# Patient Record
Sex: FEMALE | HISPANIC OR LATINO | URBAN - METROPOLITAN AREA
[De-identification: names, ages, dates, MRNs, and addresses within clinical notes are randomized per-mention and may not be internally consistent; named-entity substitution may affect disease eponyms.]

---

## 2023-11-06 ENCOUNTER — TELEPHONE (OUTPATIENT)
Age: 28
End: 2023-11-06

## 2023-12-05 ENCOUNTER — TELEPHONE (OUTPATIENT)
Dept: BARIATRICS | Facility: CLINIC | Age: 28
End: 2023-12-05

## 2023-12-05 NOTE — TELEPHONE ENCOUNTER
I called and left message for the patient to call the  mgt office in [place] back to schedule a consult. Received a referral from outside  pcp.

## 2024-03-28 ENCOUNTER — PATIENT OUTREACH (OUTPATIENT)
Dept: BARIATRICS | Facility: CLINIC | Age: 29
End: 2024-03-28

## 2024-04-01 ENCOUNTER — CONSULT (OUTPATIENT)
Dept: BARIATRICS | Facility: CLINIC | Age: 29
End: 2024-04-01
Payer: COMMERCIAL

## 2024-04-01 VITALS
HEART RATE: 88 BPM | WEIGHT: 258.6 LBS | HEIGHT: 68 IN | OXYGEN SATURATION: 99 % | BODY MASS INDEX: 39.19 KG/M2 | TEMPERATURE: 98.2 F | DIASTOLIC BLOOD PRESSURE: 80 MMHG | RESPIRATION RATE: 16 BRPM | SYSTOLIC BLOOD PRESSURE: 125 MMHG

## 2024-04-01 DIAGNOSIS — E66.9 CLASS 2 OBESITY: Primary | ICD-10-CM

## 2024-04-01 DIAGNOSIS — I10 ESSENTIAL HYPERTENSION: ICD-10-CM

## 2024-04-01 DIAGNOSIS — R63.8 ABNORMAL CRAVING: ICD-10-CM

## 2024-04-01 PROBLEM — E66.812 CLASS 2 OBESITY: Status: ACTIVE | Noted: 2024-04-01

## 2024-04-01 PROCEDURE — 99245 OFF/OP CONSLTJ NEW/EST HI 55: CPT | Performed by: PHYSICIAN ASSISTANT

## 2024-04-01 RX ORDER — TOPIRAMATE 50 MG/1
TABLET, FILM COATED ORAL
Qty: 30 TABLET | Refills: 2 | Status: SHIPPED | OUTPATIENT
Start: 2024-04-01 | End: 2024-04-01 | Stop reason: SDUPTHER

## 2024-04-01 RX ORDER — TOPIRAMATE 50 MG/1
TABLET, FILM COATED ORAL
Qty: 30 TABLET | Refills: 3 | Status: SHIPPED | OUTPATIENT
Start: 2024-04-01

## 2024-04-01 RX ORDER — HYDROCHLOROTHIAZIDE 12.5 MG/1
12.5 CAPSULE, GELATIN COATED ORAL DAILY
COMMUNITY
Start: 2024-01-15

## 2024-04-01 RX ORDER — MEDROXYPROGESTERONE ACETATE 150 MG/ML
150 INJECTION, SUSPENSION INTRAMUSCULAR
COMMUNITY
Start: 2023-07-25 | End: 2024-10-17

## 2024-04-01 NOTE — PROGRESS NOTES
Assessment/Plan:    Class 2 obesity  -Discussed options of HealthyCORE-Intensive Lifestyle Intervention Program, Very Low Calorie Diet-VLCD, Conservative Program, Usama-En-Y Gastric Bypass, and Vertical Sleeve Gastrectomy and the role of weight loss medications.  -Initial weight loss goal of 5-10% weight loss for improved health  -prefers to avoid bariatric surgery at this time   -Screening labs: BMP reviewed from 9/2023; ordered- recommended she check coverage  -Patient is interested in pursuing Conservative Program  -Calorie goals, sample menu, portion size guidelines, and food logging reviewed with the patient. Switch to non calorie beverages, add veg to meals; discussed plate recs   -Discussed supply issue of injectables   Patient denies personal and family history of MEN2 tumors and medullary thyroid/thyroid carcinoma. Patient denies personal history of pancreatitis.   Denies hx of kidney stone and glaucoma  -Not interested in phentermine due to the frequent visits.  -Start Topamax after/on day of next depo injection (as HCG done at this time and she is due for this tomorrow)   -Se profile reviewed with the pt    Follow up in approximately  3-4 months  with Non-Surgical Physician/Advanced Practitioner.    Goals:  Food log (ie.) [email or web address],[email or web address],[email or web address],Neurotrack.com,etc. baritastic  No sugary beverages. At least 64oz of water daily.  Increase physical activity by 10 minutes daily. Gradually increase physical activity to a goal of 5 days per week for 30 minutes of MODERATE intensity PLUS 2 days per week of FULL BODY resistance training  5-10 servings of fruits and vegetables per day and 25-35 grams of dietary fiber per day, gradually increasing    Diagnoses and all orders for this visit:    Class 2 obesity  -     TSH, 3rd generation with Free T4 reflex; Future  -     Cancel: Insulin, fasting; Future  -     Cancel: Hemoglobin A1C; Future  -     Cancel: Lipid panel; Future  -     Hemoglobin  A1C; Future  -     TSH, 3rd generation with Free T4 reflex; Future  -     Insulin, fasting; Future  -     Lipid panel; Future  -     Comprehensive metabolic panel; Future  -     topiramate (Topamax) 50 MG tablet; Take 1/2 tab po daily between 3-4 pm for 2 weeks then increase to one tab po daily between 3-4 pm    Essential hypertension  -     Hemoglobin A1C; Future  -     TSH, 3rd generation with Free T4 reflex; Future  -     Insulin, fasting; Future  -     Lipid panel; Future  -     Comprehensive metabolic panel; Future  -     topiramate (Topamax) 50 MG tablet; Take 1/2 tab po daily between 3-4 pm for 2 weeks then increase to one tab po daily between 3-4 pm    Abnormal craving  -     topiramate (Topamax) 50 MG tablet; Take 1/2 tab po daily between 3-4 pm for 2 weeks then increase to one tab po daily between 3-4 pm    Body mass index 39.0-39.9, adult    Subjective:   Chief Complaint   Patient presents with    Consult     Patient ID: Camelia Coombs  is a 28 y.o. female with excess weight/obesity here to pursue weight management.  Past Medical History:   Diagnosis Date    Hypertension      HPI:  Obesity/Excess Weight:  Severity: Severe  Onset:  after 3rd pregnancy and starting Depo- 4 years ago    Modifiers: Diet and Exercise for the past week   Contributing factors: Pregnancy and Medications, stress/emotional eating, craves sweets   Associated symptoms: body image issues and decreased self esteem    Goals: 150-160 lbs   Hydration: 2-3 bottles, juice in am and sodas when dining out   Alcohol: denies  Exercise: just started walking  Dining out: weekly  Occupation: no  STOPBAN/8    B:  coffee and sweet bread  S: something sweet   L: rice + chicken and beans   S: yes- cookies, juice   D: pizza or queso and eggs  S: no     The following portions of the patient's history were reviewed and updated as appropriate: allergies, current medications, past family history, past medical history, past social history, past  "surgical history, and problem list.    Review of Systems   Cardiovascular:  Negative for chest pain and palpitations.   Psychiatric/Behavioral:  Negative for suicidal ideas (Denies HI). Nervous/anxious: pt states she was referred here bc anxiety is making her eat; discussed she needs the anxiety treated by her PCP as well.      Objective:    /80 (BP Location: Left arm, Cuff Size: Large)   Pulse 88   Temp 98.2 °F (36.8 °C) (Temporal)   Resp 16   Ht 5' 8\" (1.727 m)   Wt 117 kg (258 lb 9.6 oz)   SpO2 99%   BMI 39.32 kg/m²     Physical Exam  Vitals and nursing note reviewed.     Constitutional   General appearance: Abnormal.  well developed and obese.   Pulmonary   Respiratory effort: No increased work of breathing or signs of respiratory distress.    Abdomen   Abdomen: Abnormal.  The abdomen was obese.   Musculoskeletal   Gait and station: Normal.    Psychiatric   Orientation to person, place and time: Normal.    Affect: appropriate    55 minute visit, >50% time spent counseling patient on surgical and nonsurgical interventions for the treatment of excess weight.  Discussed the advantages and long-term outcomes with regards to bariatric surgery.  Discussed in detail nonsurgical options including intensive lifestyle intervention program, very low-calorie diet program and conservative program.  Discussed the role of weight loss medications.  Counseled patient on diet behavior and exercise modification for weight loss.        "

## 2024-04-01 NOTE — PATIENT INSTRUCTIONS
Switch to zero calorie drinks  Make half of lunch and dinner plate vegetables  Try to have fruit as snacks

## 2024-04-01 NOTE — ASSESSMENT & PLAN NOTE
-Discussed options of HealthyCORE-Intensive Lifestyle Intervention Program, Very Low Calorie Diet-VLCD, Conservative Program, Usama-En-Y Gastric Bypass, and Vertical Sleeve Gastrectomy and the role of weight loss medications.  -Initial weight loss goal of 5-10% weight loss for improved health  -prefers to avoid bariatric surgery at this time   -Screening labs: BMP reviewed from 9/2023; ordered- recommended she check coverage  -Patient is interested in pursuing Conservative Program  -Calorie goals, sample menu, portion size guidelines, and food logging reviewed with the patient. Switch to non calorie beverages, add veg to meals; discussed plate recs   -Discussed supply issue of injectables   Patient denies personal and family history of MEN2 tumors and medullary thyroid/thyroid carcinoma. Patient denies personal history of pancreatitis.   Denies hx of kidney stone and glaucoma  -Not interested in phentermine due to the frequent visits.  -Start Topamax after/on day of next depo injection (as HCG done at this time and she is due for this tomorrow)   -Se profile reviewed with the pt

## 2024-04-15 LAB — HBA1C MFR BLD HPLC: 5.7 %

## 2024-04-29 ENCOUNTER — NURSE TRIAGE (OUTPATIENT)
Age: 29
End: 2024-04-29

## 2024-04-29 DIAGNOSIS — R73.03 PREDIABETES: Primary | ICD-10-CM

## 2024-04-29 NOTE — TELEPHONE ENCOUNTER
Interpretor [id number].  Patient of Ruba Eaton PA-C.  Patient had labs that were ordered by WM done at external lab and resulted 4/15.  Patient would like lab results.  Appears Ruba had ordered labs but PCP did as well.  Will call patient and advise to call PCP for results.      Interpretor [id number] during call back.  Called patient back to advice to Call Dr. Lan at [phone]. Patient states that she spoke to Dr. Lan already but they were done for WM.  Patient thought that Ruba would discuss with her d/t waiting to switch dose of medication from 1/2 tab to full tab.  Dr. Lan ordered labs for Ruba since they were done out of [facility] and Ruba originally ordered.  Contact: [phone]

## 2024-04-29 NOTE — TELEPHONE ENCOUNTER
[id number].  Patient of Ruba Eaton PA-C.  Patient returning call.  Advised patient that Ruba will call in next [employer] script after patient receives Depo shot.  Has questions for RD regarding results. Would like call back.  Contact: [phone].  Routed to clinical.

## 2024-04-29 NOTE — TELEPHONE ENCOUNTER
Sorry the document I reviewed looked like her PCPs labs. Yes, the insulin and A1c were elevated, which may improve with weight loss and dietary changes. I am not increasing the dose or continuing to fill the Topamax as she is late for DEPO shot. She needs to f/u with her GYN. We could use metformin as an alternative if she does not plan to continue on DEPO.     Thank you,   Ruba Eaton PA-C

## 2024-05-01 NOTE — TELEPHONE ENCOUNTER
Spoke to Camelia, then she put on family member on the phone to translate. Told her Yes, the insulin and A1c were elevated, which may improve with weight loss and dietary changes. I am not increasing the dose or continuing to fill the Topamax as she is late for DEPO shot. She needs to f/u with her GYN. We could use metformin as an alternative if she does not plan to continue on DEPO.      She would like to start taking the Metformin. She also asked if she would still have to take the shot to prevent pregnancy. I told her yes.

## 2024-05-02 RX ORDER — METFORMIN HYDROCHLORIDE 750 MG/1
750 TABLET, EXTENDED RELEASE ORAL
Qty: 30 TABLET | Refills: 2 | Status: SHIPPED | OUTPATIENT
Start: 2024-05-02

## 2024-05-02 NOTE — TELEPHONE ENCOUNTER
Metformin is safe in pregnancy so if she is not interested in depo she does not need to take the shot. She should stop the metformin if she becomes pregnant unless otherwise instructed by obgyn to continue.     Common side effects : abdominal upset, diarrhea

## 2024-05-15 ENCOUNTER — TELEPHONE (OUTPATIENT)
Age: 29
End: 2024-05-15

## 2024-05-15 DIAGNOSIS — R73.03 PREDIABETES: ICD-10-CM

## 2024-05-15 RX ORDER — METFORMIN HYDROCHLORIDE 750 MG/1
750 TABLET, EXTENDED RELEASE ORAL
Qty: 30 TABLET | Refills: 2 | Status: SHIPPED | OUTPATIENT
Start: 2024-05-15

## 2024-05-15 NOTE — TELEPHONE ENCOUNTER
CELINA.  Patient of Ruba Eaton PA-C. Patient calling as her prescription for her Metformin 750mg-take one tablet daily with food never reached her pharmacy. Nurse refaxed to [facility] in [facility].   used for call. [place] #[id number]    Addendum:Unable to get [facility]

## 2024-06-07 DIAGNOSIS — R73.03 PREDIABETES: ICD-10-CM

## 2024-06-07 RX ORDER — METFORMIN HYDROCHLORIDE 750 MG/1
750 TABLET, EXTENDED RELEASE ORAL
Qty: 90 TABLET | Refills: 1 | Status: SHIPPED | OUTPATIENT
Start: 2024-06-07

## 2024-08-03 ENCOUNTER — TELEPHONE (OUTPATIENT)
Dept: BARIATRICS | Facility: CLINIC | Age: 29
End: 2024-08-03

## 2024-08-03 ENCOUNTER — TELEPHONE (OUTPATIENT)
Dept: OTHER | Facility: OTHER | Age: 29
End: 2024-08-03

## 2024-08-03 ENCOUNTER — OFFICE VISIT (OUTPATIENT)
Dept: BARIATRICS | Facility: CLINIC | Age: 29
End: 2024-08-03
Payer: COMMERCIAL

## 2024-08-03 VITALS
DIASTOLIC BLOOD PRESSURE: 74 MMHG | HEIGHT: 68 IN | SYSTOLIC BLOOD PRESSURE: 129 MMHG | RESPIRATION RATE: 16 BRPM | TEMPERATURE: 97.9 F | HEART RATE: 87 BPM | WEIGHT: 255 LBS | BODY MASS INDEX: 38.65 KG/M2 | OXYGEN SATURATION: 99 %

## 2024-08-03 DIAGNOSIS — R73.03 PREDIABETES: ICD-10-CM

## 2024-08-03 DIAGNOSIS — E66.9 CLASS 2 OBESITY: Primary | ICD-10-CM

## 2024-08-03 DIAGNOSIS — Z79.899 MEDICATION MANAGEMENT: ICD-10-CM

## 2024-08-03 PROCEDURE — 99214 OFFICE O/P EST MOD 30 MIN: CPT | Performed by: PHYSICIAN ASSISTANT

## 2024-08-03 RX ORDER — TIRZEPATIDE 2.5 MG/.5ML
2.5 INJECTION, SOLUTION SUBCUTANEOUS WEEKLY
Qty: 2 ML | Refills: 0 | Status: SHIPPED | OUTPATIENT
Start: 2024-08-03 | End: 2024-08-31

## 2024-08-03 RX ORDER — TIRZEPATIDE 7.5 MG/.5ML
7.5 INJECTION, SOLUTION SUBCUTANEOUS WEEKLY
Qty: 2 ML | Refills: 0 | Status: SHIPPED | OUTPATIENT
Start: 2024-08-03 | End: 2024-08-31

## 2024-08-03 RX ORDER — TIRZEPATIDE 5 MG/.5ML
5 INJECTION, SOLUTION SUBCUTANEOUS WEEKLY
Qty: 2 ML | Refills: 0 | Status: SHIPPED | OUTPATIENT
Start: 2024-08-03 | End: 2024-08-31

## 2024-08-03 NOTE — PROGRESS NOTES
Assessment/Plan:    Class 2 obesity  -Patient is pursuing Conservative Program  -Initial weight loss goal of 5-10% weight loss for improved health  -Screening labs  Patient denies personal and family history of MEN2 tumors and medullary thyroid/thyroid carcinoma. Patient denies personal history of pancreatitis.   Denies hx of kidney stone and glaucoma  -Not interested in phentermine due to the frequent visits.  -Dietary recall reviewed, suggestions provided  She is interested in Zepbound. SE profile reviewed. Injection/titration instructions provided. She is aware she should avoid pregnancy while on Zepbound.   Intepretor: [id number]    Initial: 258.6 (Class 2 Obesity, BMI 39.32)  Current: 255  Change: -3.6  Goal: 150-160 lbs     Follow up in approximately 3 months with Non-Surgical Physician/Advanced Practitioner.    Goals:  Food log (ie.) [email or web address],sparkpeople.com,Backyardit.[email or web address],Macrotherapy.com,etc. baritastic  No sugary beverages. At least 64oz of water daily.  Increase physical activity by 10 minutes daily. Gradually increase physical activity to a goal of 5 days per week for 30 minutes of MODERATE intensity PLUS 2 days per week of FULL BODY resistance training  5-10 servings of fruits and vegetables per day and 25-35 grams of dietary fiber per day, gradually increasing     Diagnoses and all orders for this visit:    Class 2 obesity  -     Basic metabolic panel; Future  -     tirzepatide (Zepbound) 2.5 mg/0.5 mL auto-injector; Inject 0.5 mL (2.5 mg total) under the skin once a week for 28 days Then increase to 5 mg  -     tirzepatide (Zepbound) 5 mg/0.5 mL auto-injector; Inject 0.5 mL (5 mg total) under the skin once a week for 28 days Then increase to 7.5 mg  -     tirzepatide (Zepbound) 7.5 mg/0.5 mL auto-injector; Inject 0.5 mL (7.5 mg total) under the skin once a week for 28 days Then increase to 10 mg    Body mass index 38.0-38.9, adult  -     Basic metabolic panel; Future    Medication management  -      "Basic metabolic panel; Future    Prediabetes  -     tirzepatide (Zepbound) 2.5 mg/0.5 mL auto-injector; Inject 0.5 mL (2.5 mg total) under the skin once a week for 28 days Then increase to 5 mg  -     tirzepatide (Zepbound) 5 mg/0.5 mL auto-injector; Inject 0.5 mL (5 mg total) under the skin once a week for 28 days Then increase to 7.5 mg  -     tirzepatide (Zepbound) 7.5 mg/0.5 mL auto-injector; Inject 0.5 mL (7.5 mg total) under the skin once a week for 28 days Then increase to 10 mg        Subjective:   Chief Complaint   Patient presents with    Follow-up     MWM follow up      Patient ID: Camelia Coombs  is a 28 y.o. female with excess weight/obesity here to pursue weight management.  Patient is pursuing Conservative Program.     HPI  The patient presents for MWM follow up.     Was waiting for DEPO injection so we did not start Topamax.She has since received Depo    Since last visit worked on smaller portions and eating better. Just returned from 20 day vacation last night. Felt she got off track.    Prior to vacation:   B: scrambled eggs + toast + coffee (a little bit of sugar)   S:  banana or orange   L: salami or chicken, sometimes with beans  S: fruit  D: eggs + cheese + mashed potatoes  S: fruit    Increased appetite/cravings:  Exercise: has gym membership, but stopped over her vacation, plans to restart   Hydration: has been down, was drinking soda over vacation, but does not plan to continue. Recommended switch to diet     The following portions of the patient's history were reviewed and updated as appropriate: allergies, current medications, past family history, past medical history, past social history, past surgical history, and problem list.    Review of Systems    Objective:    /74 (BP Location: Right arm, Patient Position: Sitting, Cuff Size: Adult)   Pulse 87   Temp 97.9 °F (36.6 °C) (Temporal)   Resp 16   Ht 5' 8\" (1.727 m)   Wt 116 kg (255 lb)   SpO2 99%   BMI 38.77 kg/m²      " Physical Exam  Vitals and nursing note reviewed.     Constitutional   General appearance: Abnormal.  well developed and obese.   Pulmonary   Respiratory effort: No increased work of breathing or signs of respiratory distress.    Abdomen   Abdomen: Abnormal.  The abdomen was obese.   Musculoskeletal   Gait and station: Normal.    Psychiatric   Orientation to person, place and time: Normal.    Affect: appropriate

## 2024-08-03 NOTE — PATIENT INSTRUCTIONS
Add veg to lunch and dinner (try for this to be 1/2 of your plate)   Limit how much cheese as this can be high in calorie   Switch to diet drinks    IF zepbound is covered STOP metformin    For Zepbound:   Inject 2.5 mg subcutaneous every week x4 weeks then  5 mg every week for 4 weeks then   7.5 mg every week for 4 weeks then 10 mg every week for 4 weeks  Then 12.5 mg every week for 4 weeks then  15 mg every week for 4 weeks       -Females should use another type of birth control for 4 weeks after you start Zepbound and for 4 weeks after each increase in your dose of Zepbound.   Visit [email or web address] for further information/injection instructions. Please eat small frequent meals to help reduce nausea. Lemon water and saltine crackers may help with this. If you experience fever, nausea/vomiting, and pain radiating to your back this may be a sign of pancreatitis. Please have ER evaluation with this occurs. Zepbound should be stopped two months prior to trying to conceive.   -Clean skin before injecting. Rotate injection site each time.

## 2024-08-03 NOTE — ASSESSMENT & PLAN NOTE
-Patient is pursuing Conservative Program  -Initial weight loss goal of 5-10% weight loss for improved health  -Screening labs  Patient denies personal and family history of MEN2 tumors and medullary thyroid/thyroid carcinoma. Patient denies personal history of pancreatitis.   Denies hx of kidney stone and glaucoma  -Not interested in phentermine due to the frequent visits.  -Dietary recall reviewed, suggestions provided  She is interested in Zepbound. SE profile reviewed. Injection/titration instructions provided. She is aware she should avoid pregnancy while on Zepbound.   Intepretor: [id number]    Initial: 258.6 (Class 2 Obesity, BMI 39.32)  Current: 255  Change: -3.6  Goal: 150-160 lbs

## 2024-08-05 ENCOUNTER — TELEPHONE (OUTPATIENT)
Dept: BARIATRICS | Facility: CLINIC | Age: 29
End: 2024-08-05

## 2024-08-05 NOTE — TELEPHONE ENCOUNTER
Spoke to Camelia and apologized that we did not call to inform you of the new location stating on 8/1/24.

## 2024-08-08 ENCOUNTER — TELEPHONE (OUTPATIENT)
Dept: BARIATRICS | Facility: CLINIC | Age: 29
End: 2024-08-08

## 2024-08-27 ENCOUNTER — APPOINTMENT (OUTPATIENT)
Dept: LAB | Facility: HOSPITAL | Age: 29
End: 2024-08-27
Payer: COMMERCIAL

## 2024-08-27 DIAGNOSIS — E66.9 CLASS 2 OBESITY: ICD-10-CM

## 2024-08-27 DIAGNOSIS — I10 ESSENTIAL HYPERTENSION: ICD-10-CM

## 2024-08-27 DIAGNOSIS — Z79.899 MEDICATION MANAGEMENT: ICD-10-CM

## 2024-08-27 LAB
ALBUMIN SERPL BCG-MCNC: 4.1 G/DL (ref 3.5–5)
ALP SERPL-CCNC: 71 U/L (ref 34–104)
ALT SERPL W P-5'-P-CCNC: 12 U/L (ref 7–52)
ANION GAP SERPL CALCULATED.3IONS-SCNC: 8 MMOL/L (ref 4–13)
AST SERPL W P-5'-P-CCNC: 13 U/L (ref 13–39)
BILIRUB SERPL-MCNC: 0.51 MG/DL (ref 0.2–1)
BUN SERPL-MCNC: 9 MG/DL (ref 5–25)
CALCIUM SERPL-MCNC: 9.2 MG/DL (ref 8.4–10.2)
CHLORIDE SERPL-SCNC: 104 MMOL/L (ref 96–108)
CHOLEST SERPL-MCNC: 142 MG/DL
CO2 SERPL-SCNC: 26 MMOL/L (ref 21–32)
CREAT SERPL-MCNC: 0.65 MG/DL (ref 0.6–1.3)
EST. AVERAGE GLUCOSE BLD GHB EST-MCNC: 111 MG/DL
GFR SERPL CREATININE-BSD FRML MDRD: 119 ML/MIN/1.73SQ M
GLUCOSE P FAST SERPL-MCNC: 94 MG/DL (ref 65–99)
HBA1C MFR BLD: 5.5 %
HDLC SERPL-MCNC: 30 MG/DL
INSULIN SERPL-ACNC: 18.76 UIU/ML (ref 1.9–23)
LDLC SERPL CALC-MCNC: 97 MG/DL (ref 0–100)
NONHDLC SERPL-MCNC: 112 MG/DL
POTASSIUM SERPL-SCNC: 3.5 MMOL/L (ref 3.5–5.3)
PROT SERPL-MCNC: 7.3 G/DL (ref 6.4–8.4)
SODIUM SERPL-SCNC: 138 MMOL/L (ref 135–147)
TRIGL SERPL-MCNC: 74 MG/DL
TSH SERPL DL<=0.05 MIU/L-ACNC: 3.71 UIU/ML (ref 0.45–4.5)

## 2024-08-27 PROCEDURE — 84443 ASSAY THYROID STIM HORMONE: CPT

## 2024-08-27 PROCEDURE — 80053 COMPREHEN METABOLIC PANEL: CPT

## 2024-08-27 PROCEDURE — 83036 HEMOGLOBIN GLYCOSYLATED A1C: CPT

## 2024-08-27 PROCEDURE — 83525 ASSAY OF INSULIN: CPT

## 2024-08-27 PROCEDURE — 36415 COLL VENOUS BLD VENIPUNCTURE: CPT

## 2024-08-27 PROCEDURE — 80061 LIPID PANEL: CPT

## 2024-10-05 ENCOUNTER — OFFICE VISIT (OUTPATIENT)
Age: 29
End: 2024-10-05
Payer: COMMERCIAL

## 2024-10-05 VITALS
DIASTOLIC BLOOD PRESSURE: 73 MMHG | RESPIRATION RATE: 16 BRPM | HEIGHT: 68 IN | SYSTOLIC BLOOD PRESSURE: 121 MMHG | BODY MASS INDEX: 35.01 KG/M2 | WEIGHT: 231 LBS | TEMPERATURE: 97.4 F | HEART RATE: 89 BPM

## 2024-10-05 DIAGNOSIS — Z79.899 MEDICATION MANAGEMENT: ICD-10-CM

## 2024-10-05 DIAGNOSIS — E66.812 CLASS 2 OBESITY: Primary | ICD-10-CM

## 2024-10-05 PROCEDURE — 99214 OFFICE O/P EST MOD 30 MIN: CPT | Performed by: PHYSICIAN ASSISTANT

## 2024-10-05 RX ORDER — TIRZEPATIDE 10 MG/.5ML
10 INJECTION, SOLUTION SUBCUTANEOUS WEEKLY
Qty: 2 ML | Refills: 5 | Status: SHIPPED | OUTPATIENT
Start: 2024-10-05 | End: 2024-11-02

## 2024-10-05 NOTE — PATIENT INSTRUCTIONS
Try using unsweetened soy milk in your smoothie as this has more protein than almond milk   Consider greek yogurt or 1/4 c of nuts with banana     Can try magnesium for constipation     Recommend checking lab coverage before having labs drawn      Intente usar leche de soja sin azúcar en skelton batido, ya que tiene más proteínas que la leche de almendras.   Considere yogur ke o 1/4 c de nueces con plátano     ¿Puedes probar el magnesio para el estreñimiento?    Se recomienda verificar la cobertura del laboratorio antes de realizar exámenes de laboratorio.

## 2024-10-05 NOTE — ASSESSMENT & PLAN NOTE
-Patient is pursuing Conservative Program  -Initial weight loss goal of 5-10% weight loss for improved health  -Screening labs: CMP, TSH, A1c, insulin, and lipid panel reviewed from 8/27/24  Patient denies personal and family history of MEN2 tumors and medullary thyroid/thyroid carcinoma. Patient denies personal history of pancreatitis.   Denies hx of kidney stone and glaucoma  -Not interested in phentermine due to the frequent visits.  -Dietary recall reviewed, suggestions provided; constipation addressed  -Zepbound started 8/3/24 (258.6)    Initial: 258.6 (Class 2 Obesity, BMI 39.32)  Current: 231 (-24 lbs since last visit)   Change: -27.6  Goal: 150-160 lbs

## 2024-10-05 NOTE — PROGRESS NOTES
Assessment/Plan:    Class 2 obesity  -Patient is pursuing Conservative Program  -Initial weight loss goal of 5-10% weight loss for improved health  -Screening labs: CMP, TSH, A1c, insulin, and lipid panel reviewed from 8/27/24  Patient denies personal and family history of MEN2 tumors and medullary thyroid/thyroid carcinoma. Patient denies personal history of pancreatitis.   Denies hx of kidney stone and glaucoma  -Not interested in phentermine due to the frequent visits.  -Dietary recall reviewed, suggestions provided; constipation addressed  -Zepbound started 8/3/24 (258.6)    Initial: 258.6 (Class 2 Obesity, BMI 39.32)  Current: 231 (-24 lbs since last visit)   Change: -27.6  Goal: 150-160 lbs     Follow up in 4 months     Monitor BP and notify provider if low (<110/70) or sx of lightheadedness.   Reviewed potential supply issues for this medication     Goals:  Food log (ie.) www.[employer].com,sparkpeople.com,loseit.com,calorieking.com,etc. baritastic  No sugary beverages. At least 64oz of water daily.  Increase physical activity by 10 minutes daily. Gradually increase physical activity to a goal of 5 days per week for 30 minutes of MODERATE intensity PLUS 2 days per week of FULL BODY resistance training  5-10 servings of fruits and vegetables per day and 25-35 grams of dietary fiber per day, gradually increasing  Try using unsweetened soy milk in your smoothie as this has more protein than almond milk   Consider greek yogurt or 1/4 c of nuts with banana   Can try magnesium for constipation      Diagnoses and all orders for this visit:    Class 2 obesity  -     tirzepatide (Zepbound) 10 mg/0.5 mL auto-injector; Inject 0.5 mL (10 mg total) under the skin once a week for 28 days  -     Basic metabolic panel; Future    Body mass index 35.0-35.9, adult  -     Basic metabolic panel; Future    Medication management  -     Basic metabolic panel; Future        Subjective:   Chief Complaint   Patient presents with     "Follow-up     MWM follow up      Patient ID: Camelia Coombs  is a 28 y.o. female with excess weight/obesity here to pursue weight management.  Patient is pursuing Conservative Program.     HPI  The patient presents for MWM follow up.     Tolerating Finished 5 mg, starting 7.5 mg on Monday   Nausea on first 2 days  +effects on appetite    B: egg +/- bread  S: no  L: fish + salad  S: no  D: smoothie or banana   S: no    Checks BP at home and systolic is usually 120; denies lightheadedness    Exercise: 30 minutes 3x/week  Hydration: \"a lot\" of water- estimates  mL     The following portions of the patient's history were reviewed and updated as appropriate: allergies, current medications, past family history, past medical history, past social history, past surgical history, and problem list.    Review of Systems   Gastrointestinal:  Positive for constipation (sometimes hard to pass, other times not).     Objective:    /73 (BP Location: Right arm, Patient Position: Sitting, Cuff Size: Adult)   Pulse 89   Temp (!) 97.4 °F (36.3 °C) (Temporal)   Resp 16   Ht 5' 8\" (1.727 m)   Wt 105 kg (231 lb)   BMI 35.12 kg/m²      Physical Exam  Vitals and nursing note reviewed.     Constitutional   General appearance: Abnormal.  well developed and obese.   Pulmonary   Respiratory effort: No increased work of breathing or signs of respiratory distress.    Abdomen   Abdomen: Abnormal.  The abdomen was obese.   Musculoskeletal   Gait and station: Normal.    Psychiatric   Orientation to person, place and time: Normal.    Affect: appropriate    "

## 2024-11-11 ENCOUNTER — APPOINTMENT (OUTPATIENT)
Dept: LAB | Facility: HOSPITAL | Age: 29
End: 2024-11-11
Payer: COMMERCIAL

## 2024-11-11 DIAGNOSIS — E66.812 CLASS 2 OBESITY: ICD-10-CM

## 2024-11-11 DIAGNOSIS — Z79.899 MEDICATION MANAGEMENT: ICD-10-CM

## 2024-11-11 LAB
ANION GAP SERPL CALCULATED.3IONS-SCNC: 6 MMOL/L (ref 4–13)
BUN SERPL-MCNC: 9 MG/DL (ref 5–25)
CALCIUM SERPL-MCNC: 9.2 MG/DL (ref 8.4–10.2)
CHLORIDE SERPL-SCNC: 105 MMOL/L (ref 96–108)
CO2 SERPL-SCNC: 27 MMOL/L (ref 21–32)
CREAT SERPL-MCNC: 0.67 MG/DL (ref 0.6–1.3)
GFR SERPL CREATININE-BSD FRML MDRD: 118 ML/MIN/1.73SQ M
GLUCOSE P FAST SERPL-MCNC: 97 MG/DL (ref 65–99)
POTASSIUM SERPL-SCNC: 4 MMOL/L (ref 3.5–5.3)
SODIUM SERPL-SCNC: 138 MMOL/L (ref 135–147)

## 2024-11-11 PROCEDURE — 80048 BASIC METABOLIC PNL TOTAL CA: CPT

## 2024-11-11 PROCEDURE — 36415 COLL VENOUS BLD VENIPUNCTURE: CPT

## 2025-01-27 NOTE — PROGRESS NOTES
Assessment/Plan:  Class 1 obesity  -Patient is pursuing Conservative Program  -Initial weight loss goal of 5-10% weight loss for improved health  -Screening labs: CMP, TSH, A1c, insulin, and lipid panel reviewed from 8/27/24; BMP reviewed from 11/11/24  Patient denies personal and family history of MEN2 tumors and medullary thyroid/thyroid carcinoma. Patient denies personal history of pancreatitis.   Denies hx of kidney stone and glaucoma  -Not interested in phentermine due to the frequent visits.  -Dietary recall reviewed, suggestions provided; constipation addressed  -Zepbound started 8/3/24 (258.6). Has been tolerating 10 mg for the last few months. Would like to increase dose.      Initial: 258.6 (Class 2 Obesity, BMI 39.32)  Current: 205 lbs (-16 lbs since last visit)   Change: -53.6  Goal: 150-160 lbs   Appears will need PA renewal on 2/8/25    Follow up in approximately 3 months with Non-Surgical Physician/Advanced Practitioner.    Goals:  Food log (ie.) www.HopStop.com.com,sparkpeople.com,Leiyooit.com,ConnectEdu.com,etc. baritastic  No sugary beverages. At least 64oz of water daily.  Increase physical activity by 10 minutes daily. Gradually increase physical activity to a goal of 5 days per week for 30 minutes of MODERATE intensity PLUS 2 days per week of FULL BODY resistance training  5-10 servings of fruits and vegetables per day and 25-35 grams of dietary fiber per day, gradually increasing       Diagnoses and all orders for this visit:    Class 1 obesity  -     Discontinue: tirzepatide (Zepbound) 10 mg/0.5 mL auto-injector; Inject 0.5 mL (10 mg total) under the skin once a week  -     Discontinue: tirzepatide (Zepbound) 10 mg/0.5 mL auto-injector; Inject 0.5 mL (10 mg total) under the skin once a week  -     tirzepatide (Zepbound) 12.5 mg/0.5 mL auto-injector; Inject 0.5 mL (12.5 mg total) under the skin once a week for 28 days Then increase to 15 mg  -     tirzepatide (Zepbound) 15 mg/0.5 mL  "auto-injector; Inject 0.5 mL (15 mg total) under the skin once a week    Body mass index 31.0-31.9, adult        Subjective:   Chief Complaint   Patient presents with    Follow-up     MWM follow up      Patient ID: Camelia Coombs  is a 29 y.o. female with excess weight/obesity here to pursue weight management.  Patient is pursuing Conservative Program.     HPI  The patient presents for MWM follow up. [employer]  utilized    Zepbound- tolerating, denies SE; + effects    BP: checking at home- 120s systolic     B: often skips - wakes up later for her job   S: fruit  and yogurt  L: vegetables and chicken   S: no  D: similar to lunch   S: no     Exercise: has been challenging due to work schedule   Hydration: reports drinking water throughout entire day; no longer drinking juice   Sleep: 5-6 hours     The following portions of the patient's history were reviewed and updated as appropriate: allergies, current medications, past family history, past medical history, past social history, past surgical history, and problem list.    Review of Systems   Gastrointestinal:  Negative for constipation and diarrhea.   Psychiatric/Behavioral: Negative.       Objective:    /73 (BP Location: Right arm, Patient Position: Sitting, Cuff Size: Adult)   Pulse 97   Temp (!) 97.3 °F (36.3 °C) (Temporal)   Ht 5' 8\" (1.727 m)   Wt 93.1 kg (205 lb 4.8 oz)   BMI 31.22 kg/m²      Physical Exam  Vitals and nursing note reviewed.     Constitutional   General appearance: Abnormal.  well developed and obese.   Pulmonary   Respiratory effort: No increased work of breathing or signs of respiratory distress.    Abdomen   Abdomen: Abnormal.  The abdomen was obese.   Musculoskeletal   Gait and station: Normal.    Psychiatric   Orientation to person, place and time: Normal.    Affect: appropriate    "

## 2025-02-01 ENCOUNTER — OFFICE VISIT (OUTPATIENT)
Age: 30
End: 2025-02-01
Payer: COMMERCIAL

## 2025-02-01 ENCOUNTER — TELEPHONE (OUTPATIENT)
Age: 30
End: 2025-02-01

## 2025-02-01 VITALS
TEMPERATURE: 97.3 F | WEIGHT: 205.3 LBS | HEART RATE: 97 BPM | SYSTOLIC BLOOD PRESSURE: 124 MMHG | DIASTOLIC BLOOD PRESSURE: 73 MMHG | HEIGHT: 68 IN | BODY MASS INDEX: 31.11 KG/M2

## 2025-02-01 DIAGNOSIS — E66.811 CLASS 1 OBESITY: Primary | ICD-10-CM

## 2025-02-01 PROCEDURE — 99214 OFFICE O/P EST MOD 30 MIN: CPT | Performed by: PHYSICIAN ASSISTANT

## 2025-02-01 RX ORDER — TIRZEPATIDE 10 MG/.5ML
10 INJECTION, SOLUTION SUBCUTANEOUS WEEKLY
Qty: 2 ML | Refills: 3 | Status: SHIPPED | OUTPATIENT
Start: 2025-02-01 | End: 2025-02-01 | Stop reason: SDUPTHER

## 2025-02-01 RX ORDER — TIRZEPATIDE 15 MG/.5ML
15 INJECTION, SOLUTION SUBCUTANEOUS WEEKLY
Qty: 2 ML | Refills: 3 | Status: SHIPPED | OUTPATIENT
Start: 2025-02-01 | End: 2025-05-24

## 2025-02-01 RX ORDER — TIRZEPATIDE 10 MG/.5ML
10 INJECTION, SOLUTION SUBCUTANEOUS WEEKLY
Qty: 2 ML | Refills: 4 | Status: SHIPPED | OUTPATIENT
Start: 2025-02-01 | End: 2025-02-01

## 2025-02-01 RX ORDER — TIRZEPATIDE 12.5 MG/.5ML
12.5 INJECTION, SOLUTION SUBCUTANEOUS WEEKLY
Qty: 2 ML | Refills: 0 | Status: SHIPPED | OUTPATIENT
Start: 2025-02-01 | End: 2025-03-01

## 2025-02-01 NOTE — ASSESSMENT & PLAN NOTE
-Patient is pursuing Conservative Program  -Initial weight loss goal of 5-10% weight loss for improved health  -Screening labs: CMP, TSH, A1c, insulin, and lipid panel reviewed from 8/27/24; BMP reviewed from 11/11/24  Patient denies personal and family history of MEN2 tumors and medullary thyroid/thyroid carcinoma. Patient denies personal history of pancreatitis.   Denies hx of kidney stone and glaucoma  -Not interested in phentermine due to the frequent visits.  -Dietary recall reviewed, suggestions provided; constipation addressed  -Zepbound started 8/3/24 (258.6). Has been tolerating 10 mg for the last few months. Would like to increase dose.      Initial: 258.6 (Class 2 Obesity, BMI 39.32)  Current: 205 lbs (-16 lbs since last visit)   Change: -53.6  Goal: 150-160 lbs

## 2025-02-01 NOTE — PATIENT INSTRUCTIONS
intenta hacer ejercicio en tu día kane; intenta hacer videos de yourtube para entrenamiento de fuerza    si la presión arterial llega a 100-110/60, notifique al proveedor    Aumente Zepbound a 12,5 mg pancho un mes y luego aumente a 15 mg. Notifique al proveedor si tiene efectos secundarios.    ¡Continúa con el gran trabajo!

## 2025-02-12 ENCOUNTER — TELEPHONE (OUTPATIENT)
Dept: UROLOGY | Facility: CLINIC | Age: 30
End: 2025-02-12

## 2025-02-12 NOTE — TELEPHONE ENCOUNTER
Please initiate prior authorization for the following medication: irzepatide (Zepbound) 12.5 mg/0.5 mL auto-injector [[id number]]    Order Details  Dose: 15 mg Route: Subcutaneous Frequency: Weekly   Dispense Quantity: 2 mL Refills: 0          Sig: Inject 0.5 mL (15 mg total) under the skin once a week for 28 days

## 2025-02-20 NOTE — TELEPHONE ENCOUNTER
[facility] Approved 2/19/25-8/19/25.  Pharmacy was notified.  Patient just received 12.5MG dose on 2/6/25 so too soon to fill for the 15MG.

## 2025-02-27 ENCOUNTER — OFFICE VISIT (OUTPATIENT)
Dept: URGENT CARE | Facility: CLINIC | Age: 30
End: 2025-02-27
Payer: COMMERCIAL

## 2025-02-27 VITALS
BODY MASS INDEX: 32.4 KG/M2 | WEIGHT: 201.6 LBS | OXYGEN SATURATION: 94 % | HEART RATE: 106 BPM | SYSTOLIC BLOOD PRESSURE: 128 MMHG | RESPIRATION RATE: 12 BRPM | DIASTOLIC BLOOD PRESSURE: 77 MMHG | TEMPERATURE: 97.4 F | HEIGHT: 66 IN

## 2025-02-27 DIAGNOSIS — B34.9 VIRAL INFECTION: Primary | ICD-10-CM

## 2025-02-27 PROCEDURE — 87636 SARSCOV2 & INF A&B AMP PRB: CPT

## 2025-02-27 PROCEDURE — 99203 OFFICE O/P NEW LOW 30 MIN: CPT

## 2025-02-27 RX ORDER — BENZONATATE 100 MG/1
100 CAPSULE ORAL 3 TIMES DAILY PRN
Qty: 20 CAPSULE | Refills: 0 | Status: SHIPPED | OUTPATIENT
Start: 2025-02-27

## 2025-02-27 RX ORDER — METHYLPREDNISOLONE 4 MG/1
TABLET ORAL
Qty: 1 EACH | Refills: 0 | Status: SHIPPED | OUTPATIENT
Start: 2025-02-27

## 2025-02-27 RX ORDER — ALBUTEROL SULFATE 0.83 MG/ML
2.5 SOLUTION RESPIRATORY (INHALATION) EVERY 6 HOURS PRN
Qty: 30 ML | Refills: 0 | Status: SHIPPED | OUTPATIENT
Start: 2025-02-27

## 2025-02-27 RX ORDER — ONDANSETRON 4 MG/1
4 TABLET, FILM COATED ORAL EVERY 8 HOURS PRN
Qty: 20 TABLET | Refills: 0 | Status: SHIPPED | OUTPATIENT
Start: 2025-02-27

## 2025-02-27 RX ORDER — ALBUTEROL SULFATE 90 UG/1
2 INHALANT RESPIRATORY (INHALATION) EVERY 6 HOURS PRN
Qty: 8.5 G | Refills: 0 | Status: SHIPPED | OUTPATIENT
Start: 2025-02-27

## 2025-02-27 NOTE — PROGRESS NOTES
[facility]        NAME: Camelia Coombs is a 29 y.o. female  : 1995    MRN: [id number]  DATE: 2025  TIME: 12:07 PM    Assessment and Plan   Viral infection [B34.9]  1. Viral infection  albuterol (2.5 mg/3 mL) 0.083 % nebulizer solution    albuterol (ProAir HFA) 90 mcg/act inhaler    methylPREDNISolone 4 MG tablet therapy pack    benzonatate (TESSALON PERLES) 100 mg capsule    ondansetron (ZOFRAN) 4 mg tablet    Covid/Flu- Office Collect Normal    Covid/Flu- Office Collect Normal        Suspected viral infection with potential asthma exacerbation given wheezing and decreased O2 sat, provided steroid and albuterol supplies.  Recommended patient take Tylenol for body aches and fevers.  Recommended further evaluation or ED evaluation should symptoms worsen.    Patient Instructions     Patient Instructions   Viral symptoms may last for a total of 1-3 weeks. Symptoms typically start with a sore throat and progress to include sinus pain/pressure, runny nose (yellow/green), and congestion/cough. The yellow/green color does not necessarily indicate a bacterial sinus infection. If your sinus symptoms worsen on day 10-14, you may want to consider re-evaluation for a possible secondary bacterial sinus infection. Coughs are typically most bothersome the first 1-2 weeks. Coughs frequently linger for 4-6 weeks. However, have your lungs re-evaluated if you develop sudden worsening cough, shortness or breath or chest discomfort.       Vitamin D3 2000 IU daily  Vitamin C 1000mg twice per day  Some studies suggest that Zinc 12.5-15mg every 2 hours while awake x 5 days may shorten the duration cold symptoms by 1-2 days.   Fluids and rest  Nasal saline spray (Extra Strength) 3 drops in each nostril 4x per day may shorten the duration of illness by 1-2 days and help prevent spread.  Afrin if severe congestion (do not use for more than 3 days)  Over the counter cold medication as needed (EX: Mucinex  DM, Sudafed I.e. pseudoephedrine, Tylenol, Flonase)  Sinus Rinses (use distilled water only and carefully follow instructions)  Salt water gargles and chloraseptic spray  Throat Coat Tea (herbal licorice root tea for sore throat-add honey unless diabetic)  Warm compresses over sinuses  Steam treatment (utilize proper safety precautions when in contact with hot water/steam)      Follow up with PCP in 3-5 days.  Proceed to  ER if symptoms worsen.    Chief Complaint     Chief Complaint   Patient presents with    Cold Like Symptoms     Cough, chest congestion, and fevers starting 2 days ago. Body aches starting today.         History of Present Illness       29-year-old female PMH asthma presenting with  for cold-like symptoms x 2 days.  Patient is [place]-speaking with some English speaking skills.  Patient requested that her  be the  for today's visit denying use of iPad .  Patient states that several coworkers have been getting sick and that their whole household with children is starting to get sick with similar symptoms.  Patient is reporting feeling very rundown, sneezing, cough, congestion, nausea persistently, body aches and headaches.  Reports having to use her nebulizer from feeling tightness in her chest and getting relief however is wondering if she can get an inhaler for today because she cannot take it to work.        Review of Systems   Review of Systems   Constitutional:  Positive for chills, fatigue and fever. Negative for diaphoresis.   HENT:  Positive for congestion, postnasal drip and sore throat. Negative for ear discharge, ear pain, rhinorrhea and sinus pressure.    Respiratory:  Positive for cough and chest tightness. Negative for choking.    Cardiovascular:  Negative for chest pain and palpitations.   Gastrointestinal:  Positive for nausea. Negative for diarrhea and vomiting.   Musculoskeletal:  Positive for myalgias. Negative for arthralgias.   Skin:  Negative  for color change.   Neurological:  Positive for headaches. Negative for dizziness and light-headedness.         Current Medications       Current Outpatient Medications:     albuterol (2.5 mg/3 mL) 0.083 % nebulizer solution, Take 3 mL (2.5 mg total) by nebulization every 6 (six) hours as needed for wheezing or shortness of breath, Disp: 30 mL, Rfl: 0    albuterol (ProAir HFA) 90 mcg/act inhaler, Inhale 2 puffs every 6 (six) hours as needed for wheezing, Disp: 8.5 g, Rfl: 0    ALBUTEROL IN, Inhale, Disp: , Rfl:     benzonatate (TESSALON PERLES) 100 mg capsule, Take 1 capsule (100 mg total) by mouth 3 (three) times a day as needed for cough, Disp: 20 capsule, Rfl: 0    hydroCHLOROthiazide 12.5 mg capsule, Take 12.5 mg by mouth daily, Disp: , Rfl:     medroxyPROGESTERone (DEPO-PROVERA) 150 mg/mL injection, Inject 150 mg into a muscle, Disp: , Rfl:     methylPREDNISolone 4 MG tablet therapy pack, Use as directed on package, Disp: 1 each, Rfl: 0    ondansetron (ZOFRAN) 4 mg tablet, Take 1 tablet (4 mg total) by mouth every 8 (eight) hours as needed for nausea or vomiting, Disp: 20 tablet, Rfl: 0    tirzepatide (Zepbound) 12.5 mg/0.5 mL auto-injector, Inject 0.5 mL (12.5 mg total) under the skin once a week for 28 days Then increase to 15 mg, Disp: 2 mL, Rfl: 0    tirzepatide (Zepbound) 15 mg/0.5 mL auto-injector, Inject 0.5 mL (15 mg total) under the skin once a week (Patient not taking: Reported on 2025), Disp: 2 mL, Rfl: 3    Current Allergies     Allergies as of 2025    (No Known Allergies)            The following portions of the patient's history were reviewed and updated as appropriate: allergies, current medications, past family history, past medical history, past social history, past surgical history and problem list.     Past Medical History:   Diagnosis Date    Asthma     Hypertension        Past Surgical History:   Procedure Laterality Date     SECTION      x3       Family History   Problem  "Relation Age of Onset    Diabetes Mother     Heart disease Father     Cancer Neg Hx          Medications have been verified.        Objective   /77   Pulse (!) 106   Temp (!) 97.4 °F (36.3 °C)   Resp 12   Ht 5' 6\" (1.676 m)   Wt 91.4 kg (201 lb 9.6 oz)   SpO2 94%   BMI 32.54 kg/m²        Physical Exam     Physical Exam  Vitals and nursing note reviewed.   Constitutional:       General: She is not in acute distress.     Appearance: She is normal weight. She is ill-appearing.   HENT:      Head: Normocephalic and atraumatic.      Right Ear: Tympanic membrane, ear canal and external ear normal.      Left Ear: Tympanic membrane, ear canal and external ear normal.      Nose: Congestion present.      Mouth/Throat:      Mouth: Mucous membranes are moist.      Pharynx: Oropharynx is clear. No posterior oropharyngeal erythema.   Eyes:      General:         Right eye: No discharge.         Left eye: No discharge.      Conjunctiva/sclera: Conjunctivae normal.      Pupils: Pupils are equal, round, and reactive to light.   Cardiovascular:      Rate and Rhythm: Regular rhythm. Tachycardia present.      Pulses: Normal pulses.      Heart sounds: Normal heart sounds.   Pulmonary:      Effort: Pulmonary effort is normal.      Breath sounds: Wheezing present. No rhonchi.   Abdominal:      General: Bowel sounds are normal.      Palpations: Abdomen is soft.      Tenderness: There is no abdominal tenderness.   Lymphadenopathy:      Cervical: No cervical adenopathy.   Skin:     General: Skin is warm and dry.      Capillary Refill: Capillary refill takes less than 2 seconds.   Neurological:      General: No focal deficit present.      Mental Status: She is alert and oriented to person, place, and time.   Psychiatric:         Mood and Affect: Mood normal.         Behavior: Behavior normal.                   "

## 2025-02-27 NOTE — LETTER
February 27, 2025     Patient: Camelia Coombs   YOB: 1995   Date of Visit: 2/27/2025       To Whom it May Concern:    Camelia Coombs was seen in my clinic on 2/27/2025. She may return to work on 3/1/25 .    If you have any questions or concerns, please don't hesitate to call.         Sincerely,          Chdii Lewis PA-C        CC: No Recipients

## 2025-02-28 LAB
FLUAV RNA RESP QL NAA+PROBE: NEGATIVE
FLUBV RNA RESP QL NAA+PROBE: NEGATIVE
SARS-COV-2 RNA RESP QL NAA+PROBE: NEGATIVE

## 2025-06-07 ENCOUNTER — OFFICE VISIT (OUTPATIENT)
Age: 30
End: 2025-06-07
Payer: COMMERCIAL

## 2025-06-07 VITALS
WEIGHT: 181.7 LBS | SYSTOLIC BLOOD PRESSURE: 122 MMHG | DIASTOLIC BLOOD PRESSURE: 75 MMHG | TEMPERATURE: 97.8 F | RESPIRATION RATE: 16 BRPM | HEART RATE: 97 BPM | BODY MASS INDEX: 27.54 KG/M2 | HEIGHT: 68 IN

## 2025-06-07 DIAGNOSIS — E66.3 OVERWEIGHT: Primary | ICD-10-CM

## 2025-06-07 DIAGNOSIS — I10 HTN (HYPERTENSION), BENIGN: ICD-10-CM

## 2025-06-07 DIAGNOSIS — R73.03 PREDIABETES: ICD-10-CM

## 2025-06-07 PROCEDURE — 99214 OFFICE O/P EST MOD 30 MIN: CPT | Performed by: PHYSICIAN ASSISTANT

## 2025-06-07 RX ORDER — TIRZEPATIDE 15 MG/.5ML
15 INJECTION, SOLUTION SUBCUTANEOUS WEEKLY
Qty: 2 ML | Refills: 5 | Status: SHIPPED | OUTPATIENT
Start: 2025-06-07

## 2025-06-07 RX ORDER — TIRZEPATIDE 15 MG/.5ML
15 INJECTION, SOLUTION SUBCUTANEOUS WEEKLY
COMMUNITY
Start: 2025-05-21 | End: 2025-06-07 | Stop reason: SDUPTHER

## 2025-06-07 NOTE — PROGRESS NOTES
Assessment/Plan:    Overweight  -Patient is pursuing Conservative Program  -Initial weight loss goal of 5-10% weight loss for improved health  -Screening labs: CMP, TSH, A1c, insulin, and lipid panel reviewed from 8/27/24; BMP reviewed from 11/11/24  Patient denies personal and family history of MEN2 tumors and medullary thyroid/thyroid carcinoma. Patient denies personal history of pancreatitis.   Denies hx of kidney stone and glaucoma  -Not interested in phentermine due to the frequent visits.  -Dietary recall reviewed, suggestions provided  -Zepbound started 8/3/24 (258.6). Will continue, however intake during work weeks has been low due to hours/time constraints. Encourage adding protein shake and additional snack or small meal while at work- see AVS   -due for PA 8/19/25  -seeing + effects on BP    Initial: 258.6 (Class 2 Obesity, BMI 39.32)  Current: 181.9 lbs (23.1 lbs since last visit)   Change: -76.7  Goal: 150-160 lbs     Follow up in approximately 3 months with Non-Surgical Physician/Advanced Practitioner.    Goals:  Food log (ie.) www.eLibs.compal.com,sparkpeople.com,KakKstatiit.com,[employer] Group[employer].com,etc. baritastic  No sugary beverages. At least 64oz of water daily.  Increase physical activity by 10 minutes daily. Gradually increase physical activity to a goal of 5 days per week for 30 minutes of MODERATE intensity PLUS 2 days per week of FULL BODY resistance training  5-10 servings of fruits and vegetables per day and 25-35 grams of dietary fiber per day, gradually increasing     Diagnoses and all orders for this visit:    Overweight  -     Zepbound 15 MG/0.5ML auto-injector; Inject 0.5 mL (15 mg total) under the skin once a week    HTN (hypertension), benign  Comments:  has not been taking the HCTZ, she will f/u with her PCP  Orders:  -     Zepbound 15 MG/0.5ML auto-injector; Inject 0.5 mL (15 mg total) under the skin once a week    Body mass index 27.0-27.9, adult    Prediabetes  Comments:  A1c now normal,  "c/w lifestyle changes      Subjective:   Chief Complaint   Patient presents with    Follow-up     MWM follow up      Patient ID: Camelia Coombs  is a 29 y.o. female with excess weight/obesity here to pursue weight management.  Patient is pursuing Conservative Program.     HPI  The patient presents for MWM follow up.     Zepbound - states going well, denies n/v/d/c; + effects on weight; refilled    Has been working a lot; minimal time for cooking and exercise. Job is active and states she is walking constantly - unable to track steps    Works 3p- 3:30 am   Sleeps: after getting home until noon  5 pm: chicken and salad OR other protein   9-10: fruit     Does note on weekends does eat better as they are her days off.   B: eggs + potatoes  L: meat + mashed potato + salad   D: eggs + banana + cheese     Hydration: 34 oz prior to work + more at work     Taking HCTZ as needed as she didn't feel bad any more. She does check her BP regularly and she states it tells her \"normal\"   Occasionally has a headache and fatigue and takes her BP and \"is high.\" States this happens infrequently- last occurrence was 3 weeks ago. She will f/u with her PCP   Reviewed with pt the importance of medication compliance and explained having HTN rogers snot always mean sx and that BP can still be elevated. Reviewed importance of monitoring BP and discussing medication changes with provider. She verbalized understanding. Will continue to monitor BP and f/u with her PCP     The following portions of the patient's history were reviewed and updated as appropriate: allergies, current medications, past family history, past medical history, past social history, past surgical history, and problem list.    Review of Systems   Gastrointestinal: Negative.        Objective:    /75 (BP Location: Right arm, Patient Position: Sitting, Cuff Size: Adult)   Pulse 97   Temp 97.8 °F (36.6 °C) (Temporal)   Resp 16   Ht 5' 8\" (1.727 m)   Wt 82.4 kg (181 " lb 11.2 oz)   BMI 27.63 kg/m²      Physical Exam  Vitals and nursing note reviewed.     Constitutional   General appearance: Abnormal.  well developed and overweight.   Pulmonary   Respiratory effort: No increased work of breathing or signs of respiratory distress.    Musculoskeletal   Gait and station: Normal.    Psychiatric   Orientation to person, place and time: Normal.    Affect: appropriate

## 2025-06-07 NOTE — ASSESSMENT & PLAN NOTE
-Patient is pursuing Conservative Program  -Initial weight loss goal of 5-10% weight loss for improved health  -Screening labs: CMP, TSH, A1c, insulin, and lipid panel reviewed from 8/27/24; BMP reviewed from 11/11/24  Patient denies personal and family history of MEN2 tumors and medullary thyroid/thyroid carcinoma. Patient denies personal history of pancreatitis.   Denies hx of kidney stone and glaucoma  -Not interested in phentermine due to the frequent visits.  -Dietary recall reviewed, suggestions provided  -Zepbound started 8/3/24 (258.6). Will continue, however intake during work weeks has been low due to hours/time constraints. Encourage adding protein shake and additional snack or small meal while at work- see AVS   -due for PA 8/19/25  -seeing + effects on BP    Initial: 258.6 (Class 2 Obesity, BMI 39.32)  Current: 181.9 lbs (23.1 lbs since last visit)   Change: -76.7  Goal: 150-160 lbs

## 2025-06-07 NOTE — PATIENT INSTRUCTIONS
Prueba a niya un batido de proteínas al día. También puedes acompañarlo con saqib pieza de fruta.    Intenta comer algo ligero en tu otro descanso. Valerie saqib zac de proteína, yogur, queso en hebras, 1/4 de taza de theresa secos o saqib comida ligera.

## 2025-08-22 ENCOUNTER — TELEPHONE (OUTPATIENT)
Dept: BARIATRICS | Facility: CLINIC | Age: 30
End: 2025-08-22